# Patient Record
Sex: FEMALE | Race: WHITE | Employment: OTHER | URBAN - METROPOLITAN AREA
[De-identification: names, ages, dates, MRNs, and addresses within clinical notes are randomized per-mention and may not be internally consistent; named-entity substitution may affect disease eponyms.]

---

## 2020-11-16 ENCOUNTER — NEW PATIENT (OUTPATIENT)
Dept: URBAN - METROPOLITAN AREA CLINIC 26 | Facility: CLINIC | Age: 70
End: 2020-11-16

## 2020-11-16 VITALS
HEART RATE: 86 BPM | SYSTOLIC BLOOD PRESSURE: 119 MMHG | DIASTOLIC BLOOD PRESSURE: 82 MMHG | HEIGHT: 67 IN | WEIGHT: 165 LBS | BODY MASS INDEX: 25.9 KG/M2

## 2020-11-16 DIAGNOSIS — H04.123: ICD-10-CM

## 2020-11-16 DIAGNOSIS — H35.432: ICD-10-CM

## 2020-11-16 DIAGNOSIS — H35.412: ICD-10-CM

## 2020-11-16 DIAGNOSIS — D31.32: ICD-10-CM

## 2020-11-16 DIAGNOSIS — H02.831: ICD-10-CM

## 2020-11-16 DIAGNOSIS — Q14.1: ICD-10-CM

## 2020-11-16 DIAGNOSIS — H35.371: ICD-10-CM

## 2020-11-16 DIAGNOSIS — H35.341: ICD-10-CM

## 2020-11-16 DIAGNOSIS — H02.834: ICD-10-CM

## 2020-11-16 DIAGNOSIS — H43.392: ICD-10-CM

## 2020-11-16 PROCEDURE — 99204 OFFICE O/P NEW MOD 45 MIN: CPT

## 2020-11-16 PROCEDURE — 92250 FUNDUS PHOTOGRAPHY W/I&R: CPT

## 2020-11-16 ASSESSMENT — VISUAL ACUITY
OD_CC: 20/60-1
OS_PH: 20/100
OS_CC: 20/30-1
OD_SC: 20/400-1
OS_SC: 20/400

## 2020-11-16 ASSESSMENT — TONOMETRY
OD_IOP_MMHG: 18
OS_IOP_MMHG: 19

## 2020-12-02 ENCOUNTER — PRE-OP VISIT (OUTPATIENT)
Dept: URBAN - METROPOLITAN AREA CLINIC 26 | Facility: CLINIC | Age: 70
End: 2020-12-02

## 2020-12-02 VITALS — HEIGHT: 67 IN | BODY MASS INDEX: 25.9 KG/M2 | WEIGHT: 165 LBS

## 2020-12-02 DIAGNOSIS — H43.813: ICD-10-CM

## 2020-12-02 DIAGNOSIS — D31.32: ICD-10-CM

## 2020-12-02 DIAGNOSIS — H43.23: ICD-10-CM

## 2020-12-02 DIAGNOSIS — H35.341: ICD-10-CM

## 2020-12-02 DIAGNOSIS — H35.412: ICD-10-CM

## 2020-12-02 DIAGNOSIS — H35.371: ICD-10-CM

## 2020-12-02 DIAGNOSIS — H43.392: ICD-10-CM

## 2020-12-02 DIAGNOSIS — H35.432: ICD-10-CM

## 2020-12-02 DIAGNOSIS — H43.822: ICD-10-CM

## 2020-12-02 PROCEDURE — 92012 INTRM OPH EXAM EST PATIENT: CPT

## 2020-12-02 RX ORDER — PREDNISOLONE ACETATE 10 MG/ML: 1 SUSPENSION/ DROPS OPHTHALMIC

## 2020-12-02 RX ORDER — TOBRAMYCIN 3 MG/ML: 1 SOLUTION/ DROPS OPHTHALMIC EVERY 4 HOURS

## 2020-12-02 ASSESSMENT — VISUAL ACUITY
OS_CC: 20/30+2
OD_CC: 20/60-2

## 2020-12-02 ASSESSMENT — TONOMETRY
OS_IOP_MMHG: 17
OD_IOP_MMHG: 18

## 2020-12-31 ENCOUNTER — FOLLOW UP (OUTPATIENT)
Dept: URBAN - METROPOLITAN AREA CLINIC 26 | Facility: CLINIC | Age: 70
End: 2020-12-31

## 2020-12-31 DIAGNOSIS — Q14.1: ICD-10-CM

## 2020-12-31 DIAGNOSIS — H35.371: ICD-10-CM

## 2020-12-31 DIAGNOSIS — H35.432: ICD-10-CM

## 2020-12-31 DIAGNOSIS — H43.813: ICD-10-CM

## 2020-12-31 DIAGNOSIS — D31.32: ICD-10-CM

## 2020-12-31 DIAGNOSIS — H35.412: ICD-10-CM

## 2020-12-31 DIAGNOSIS — H35.341: ICD-10-CM

## 2020-12-31 DIAGNOSIS — H43.822: ICD-10-CM

## 2020-12-31 DIAGNOSIS — H43.23: ICD-10-CM

## 2020-12-31 DIAGNOSIS — H43.392: ICD-10-CM

## 2020-12-31 PROCEDURE — 92012 INTRM OPH EXAM EST PATIENT: CPT

## 2020-12-31 ASSESSMENT — VISUAL ACUITY
OS_CC: 20/30-1
OD_CC: 20/60-2

## 2020-12-31 ASSESSMENT — TONOMETRY
OS_IOP_MMHG: 15
OD_IOP_MMHG: 17

## 2021-02-05 ENCOUNTER — PRE-OP VISIT (OUTPATIENT)
Dept: URBAN - METROPOLITAN AREA CLINIC 26 | Facility: CLINIC | Age: 71
End: 2021-02-05

## 2021-02-05 VITALS — BODY MASS INDEX: 25.9 KG/M2 | HEIGHT: 67 IN | WEIGHT: 165 LBS

## 2021-02-05 DIAGNOSIS — D31.32: ICD-10-CM

## 2021-02-05 DIAGNOSIS — Q14.1: ICD-10-CM

## 2021-02-05 DIAGNOSIS — H43.392: ICD-10-CM

## 2021-02-05 DIAGNOSIS — H35.412: ICD-10-CM

## 2021-02-05 DIAGNOSIS — H43.23: ICD-10-CM

## 2021-02-05 DIAGNOSIS — H35.432: ICD-10-CM

## 2021-02-05 DIAGNOSIS — H35.371: ICD-10-CM

## 2021-02-05 DIAGNOSIS — H35.341: ICD-10-CM

## 2021-02-05 DIAGNOSIS — H43.822: ICD-10-CM

## 2021-02-05 DIAGNOSIS — H43.813: ICD-10-CM

## 2021-02-05 PROCEDURE — 92012 INTRM OPH EXAM EST PATIENT: CPT

## 2021-02-05 PROCEDURE — 92250 FUNDUS PHOTOGRAPHY W/I&R: CPT

## 2021-02-05 ASSESSMENT — TONOMETRY
OS_IOP_MMHG: 16
OD_IOP_MMHG: 12

## 2021-02-05 ASSESSMENT — VISUAL ACUITY
OD_CC: 20/80+2
OS_CC: 20/30-2

## 2021-02-10 ENCOUNTER — SURGERY/PROCEDURE (OUTPATIENT)
Dept: URBAN - METROPOLITAN AREA SURGERY 10 | Facility: SURGERY | Age: 71
End: 2021-02-10

## 2021-02-10 DIAGNOSIS — H35.341: ICD-10-CM

## 2021-02-10 PROCEDURE — 67042 VIT FOR MACULAR HOLE: CPT

## 2021-02-11 ENCOUNTER — POST OP-DILATION (OUTPATIENT)
Dept: URBAN - METROPOLITAN AREA CLINIC 26 | Facility: CLINIC | Age: 71
End: 2021-02-11

## 2021-02-11 DIAGNOSIS — H35.341: ICD-10-CM

## 2021-02-11 DIAGNOSIS — Z98.890: ICD-10-CM

## 2021-02-11 DIAGNOSIS — H35.371: ICD-10-CM

## 2021-02-11 PROCEDURE — 99024 POSTOP FOLLOW-UP VISIT: CPT

## 2021-02-11 ASSESSMENT — TONOMETRY
OD_IOP_MMHG: 19
OS_IOP_MMHG: 15

## 2021-02-11 ASSESSMENT — VISUAL ACUITY: OS_SC: 20/400

## 2021-02-17 ENCOUNTER — POST OP-DILATION (OUTPATIENT)
Dept: URBAN - METROPOLITAN AREA CLINIC 26 | Facility: CLINIC | Age: 71
End: 2021-02-17

## 2021-02-17 DIAGNOSIS — Z98.890: ICD-10-CM

## 2021-02-17 DIAGNOSIS — H35.341: ICD-10-CM

## 2021-02-17 PROCEDURE — 99024 POSTOP FOLLOW-UP VISIT: CPT

## 2021-02-17 ASSESSMENT — VISUAL ACUITY
OS_SC: 20/400
OD_SC: CF 1FT

## 2021-02-17 ASSESSMENT — TONOMETRY
OD_IOP_MMHG: 18
OS_IOP_MMHG: 17

## 2021-03-10 ENCOUNTER — POST OP-DILATION (OUTPATIENT)
Dept: URBAN - METROPOLITAN AREA CLINIC 26 | Facility: CLINIC | Age: 71
End: 2021-03-10

## 2021-03-10 DIAGNOSIS — Q14.1: ICD-10-CM

## 2021-03-10 DIAGNOSIS — H43.822: ICD-10-CM

## 2021-03-10 DIAGNOSIS — D31.32: ICD-10-CM

## 2021-03-10 DIAGNOSIS — H43.392: ICD-10-CM

## 2021-03-10 DIAGNOSIS — H35.432: ICD-10-CM

## 2021-03-10 DIAGNOSIS — H35.412: ICD-10-CM

## 2021-03-10 PROCEDURE — 92250 FUNDUS PHOTOGRAPHY W/I&R: CPT

## 2021-03-10 PROCEDURE — 99024 POSTOP FOLLOW-UP VISIT: CPT

## 2021-03-10 PROCEDURE — 92134 CPTRZ OPH DX IMG PST SGM RTA: CPT

## 2021-03-10 ASSESSMENT — TONOMETRY
OS_IOP_MMHG: 15
OD_IOP_MMHG: 16

## 2021-03-10 ASSESSMENT — VISUAL ACUITY
OS_CC: 20/30
OD_CC: 20/80-1

## 2021-04-21 ENCOUNTER — FOLLOW UP (OUTPATIENT)
Dept: URBAN - METROPOLITAN AREA CLINIC 26 | Facility: CLINIC | Age: 71
End: 2021-04-21

## 2021-04-21 DIAGNOSIS — H43.822: ICD-10-CM

## 2021-04-21 DIAGNOSIS — Q14.1: ICD-10-CM

## 2021-04-21 DIAGNOSIS — H35.432: ICD-10-CM

## 2021-04-21 DIAGNOSIS — Z98.890: ICD-10-CM

## 2021-04-21 PROCEDURE — 92250 FUNDUS PHOTOGRAPHY W/I&R: CPT

## 2021-04-21 PROCEDURE — 99024 POSTOP FOLLOW-UP VISIT: CPT

## 2021-04-21 PROCEDURE — 92134 CPTRZ OPH DX IMG PST SGM RTA: CPT

## 2021-04-21 ASSESSMENT — TONOMETRY
OD_IOP_MMHG: 17
OS_IOP_MMHG: 18

## 2021-04-21 ASSESSMENT — VISUAL ACUITY
OS_CC: 20/30-2
OD_CC: 20/60

## 2023-01-23 ENCOUNTER — FOLLOW UP (OUTPATIENT)
Dept: URBAN - METROPOLITAN AREA CLINIC 26 | Facility: CLINIC | Age: 73
End: 2023-01-23

## 2023-01-23 DIAGNOSIS — H43.822: ICD-10-CM

## 2023-01-23 DIAGNOSIS — H43.392: ICD-10-CM

## 2023-01-23 DIAGNOSIS — D31.32: ICD-10-CM

## 2023-01-23 DIAGNOSIS — H40.051: ICD-10-CM

## 2023-01-23 DIAGNOSIS — H04.123: ICD-10-CM

## 2023-01-23 DIAGNOSIS — Q14.1: ICD-10-CM

## 2023-01-23 DIAGNOSIS — H35.412: ICD-10-CM

## 2023-01-23 DIAGNOSIS — H43.22: ICD-10-CM

## 2023-01-23 DIAGNOSIS — H35.432: ICD-10-CM

## 2023-01-23 PROCEDURE — 92014 COMPRE OPH EXAM EST PT 1/>: CPT

## 2023-01-23 PROCEDURE — 92250 FUNDUS PHOTOGRAPHY W/I&R: CPT

## 2023-01-23 PROCEDURE — 92134 CPTRZ OPH DX IMG PST SGM RTA: CPT

## 2023-01-23 ASSESSMENT — TONOMETRY
OS_IOP_MMHG: 19
OD_IOP_MMHG: 22
OD_IOP_MMHG: 24

## 2023-01-23 ASSESSMENT — VISUAL ACUITY
OD_PH: 20/50+1
OD_CC: 20/70-2
OS_CC: 20/40
OS_PH: 20/30-1

## 2023-10-25 ENCOUNTER — COMPREHENSIVE EXAM (OUTPATIENT)
Dept: URBAN - METROPOLITAN AREA CLINIC 26 | Facility: CLINIC | Age: 73
End: 2023-10-25

## 2023-10-25 DIAGNOSIS — H43.822: ICD-10-CM

## 2023-10-25 DIAGNOSIS — H26.493: ICD-10-CM

## 2023-10-25 DIAGNOSIS — H02.831: ICD-10-CM

## 2023-10-25 DIAGNOSIS — Q14.1: ICD-10-CM

## 2023-10-25 DIAGNOSIS — Z98.890: ICD-10-CM

## 2023-10-25 DIAGNOSIS — H43.392: ICD-10-CM

## 2023-10-25 DIAGNOSIS — D31.32: ICD-10-CM

## 2023-10-25 DIAGNOSIS — Z96.1: ICD-10-CM

## 2023-10-25 DIAGNOSIS — H02.834: ICD-10-CM

## 2023-10-25 DIAGNOSIS — H40.051: ICD-10-CM

## 2023-10-25 DIAGNOSIS — H43.22: ICD-10-CM

## 2023-10-25 DIAGNOSIS — H04.123: ICD-10-CM

## 2023-10-25 DIAGNOSIS — H35.412: ICD-10-CM

## 2023-10-25 DIAGNOSIS — H35.432: ICD-10-CM

## 2023-10-25 PROCEDURE — 92014 COMPRE OPH EXAM EST PT 1/>: CPT

## 2023-10-25 PROCEDURE — 92250 FUNDUS PHOTOGRAPHY W/I&R: CPT | Mod: 59

## 2023-10-25 PROCEDURE — 92134 CPTRZ OPH DX IMG PST SGM RTA: CPT

## 2023-10-25 ASSESSMENT — VISUAL ACUITY
OD_PH: 20/30-2
OS_SC: 20/20-2
OD_SC: 20/100

## 2023-10-25 ASSESSMENT — TONOMETRY
OS_IOP_MMHG: 14
OD_IOP_MMHG: 19, 20
OD_IOP_MMHG: 20

## 2024-01-11 ENCOUNTER — COMPREHENSIVE EXAM (OUTPATIENT)
Dept: URBAN - METROPOLITAN AREA CLINIC 26 | Facility: CLINIC | Age: 74
End: 2024-01-11

## 2024-01-11 DIAGNOSIS — H43.22: ICD-10-CM

## 2024-01-11 DIAGNOSIS — H35.351: ICD-10-CM

## 2024-01-11 DIAGNOSIS — H26.493: ICD-10-CM

## 2024-01-11 DIAGNOSIS — H35.371: ICD-10-CM

## 2024-01-11 DIAGNOSIS — H04.123: ICD-10-CM

## 2024-01-11 DIAGNOSIS — H02.834: ICD-10-CM

## 2024-01-11 DIAGNOSIS — H40.1131: ICD-10-CM

## 2024-01-11 DIAGNOSIS — H35.372: ICD-10-CM

## 2024-01-11 DIAGNOSIS — H43.392: ICD-10-CM

## 2024-01-11 DIAGNOSIS — H35.432: ICD-10-CM

## 2024-01-11 DIAGNOSIS — Q14.1: ICD-10-CM

## 2024-01-11 DIAGNOSIS — H02.831: ICD-10-CM

## 2024-01-11 DIAGNOSIS — Z98.890: ICD-10-CM

## 2024-01-11 DIAGNOSIS — Z96.1: ICD-10-CM

## 2024-01-11 DIAGNOSIS — H35.412: ICD-10-CM

## 2024-01-11 DIAGNOSIS — H40.051: ICD-10-CM

## 2024-01-11 DIAGNOSIS — D31.32: ICD-10-CM

## 2024-01-11 DIAGNOSIS — H43.822: ICD-10-CM

## 2024-01-11 PROCEDURE — 92134 CPTRZ OPH DX IMG PST SGM RTA: CPT

## 2024-01-11 PROCEDURE — 92014 COMPRE OPH EXAM EST PT 1/>: CPT

## 2024-01-11 PROCEDURE — 92250 FUNDUS PHOTOGRAPHY W/I&R: CPT

## 2024-01-11 RX ORDER — PREDNISOLONE ACETATE 10 MG/ML: 1 SUSPENSION/ DROPS OPHTHALMIC

## 2024-01-11 RX ORDER — LATANOPROST 50 UG/ML: 1 SOLUTION/ DROPS OPHTHALMIC EVERY EVENING

## 2024-01-11 RX ORDER — KETOROLAC TROMETHAMINE 5 MG/ML: 1 SOLUTION OPHTHALMIC

## 2024-01-11 ASSESSMENT — TONOMETRY
OD_IOP_MMHG: 13
OS_IOP_MMHG: 13

## 2024-01-11 ASSESSMENT — VISUAL ACUITY
OD_SC: 20/40-1
OS_SC: 20/25-2

## 2024-02-14 ENCOUNTER — COMPREHENSIVE EXAM (OUTPATIENT)
Dept: URBAN - METROPOLITAN AREA CLINIC 26 | Facility: CLINIC | Age: 74
End: 2024-02-14

## 2024-02-14 VITALS — BODY MASS INDEX: 25.9 KG/M2 | WEIGHT: 165 LBS | HEIGHT: 67 IN

## 2024-02-14 DIAGNOSIS — H43.822: ICD-10-CM

## 2024-02-14 DIAGNOSIS — H35.432: ICD-10-CM

## 2024-02-14 DIAGNOSIS — Z96.1: ICD-10-CM

## 2024-02-14 DIAGNOSIS — H35.372: ICD-10-CM

## 2024-02-14 DIAGNOSIS — H43.392: ICD-10-CM

## 2024-02-14 DIAGNOSIS — Q14.1: ICD-10-CM

## 2024-02-14 DIAGNOSIS — H35.371: ICD-10-CM

## 2024-02-14 DIAGNOSIS — H26.493: ICD-10-CM

## 2024-02-14 DIAGNOSIS — H02.831: ICD-10-CM

## 2024-02-14 DIAGNOSIS — H40.051: ICD-10-CM

## 2024-02-14 DIAGNOSIS — H02.834: ICD-10-CM

## 2024-02-14 DIAGNOSIS — D31.32: ICD-10-CM

## 2024-02-14 DIAGNOSIS — H43.22: ICD-10-CM

## 2024-02-14 DIAGNOSIS — Z98.890: ICD-10-CM

## 2024-02-14 DIAGNOSIS — H04.123: ICD-10-CM

## 2024-02-14 DIAGNOSIS — H40.1131: ICD-10-CM

## 2024-02-14 DIAGNOSIS — H35.412: ICD-10-CM

## 2024-02-14 DIAGNOSIS — H35.351: ICD-10-CM

## 2024-02-14 PROCEDURE — 92012 INTRM OPH EXAM EST PATIENT: CPT

## 2024-02-14 PROCEDURE — 92250 FUNDUS PHOTOGRAPHY W/I&R: CPT

## 2024-02-14 PROCEDURE — 92134 CPTRZ OPH DX IMG PST SGM RTA: CPT

## 2024-02-14 RX ORDER — TOBRAMYCIN 3 MG/ML: 1 SOLUTION/ DROPS OPHTHALMIC EVERY 4 HOURS

## 2024-02-14 ASSESSMENT — TONOMETRY
OD_IOP_MMHG: 16
OS_IOP_MMHG: 13

## 2024-02-14 ASSESSMENT — VISUAL ACUITY
OD_SC: 20/30-1
OS_SC: 20/25-2

## 2024-03-06 ENCOUNTER — SURGERY/PROCEDURE (OUTPATIENT)
Facility: LOCATION | Age: 74
End: 2024-03-06

## 2024-03-06 DIAGNOSIS — H35.372: ICD-10-CM

## 2024-03-06 PROCEDURE — 67042 VIT FOR MACULAR HOLE: CPT

## 2024-03-07 ENCOUNTER — POST-OP (OUTPATIENT)
Dept: URBAN - METROPOLITAN AREA CLINIC 26 | Facility: CLINIC | Age: 74
End: 2024-03-07

## 2024-03-07 DIAGNOSIS — H04.123: ICD-10-CM

## 2024-03-07 DIAGNOSIS — D31.32: ICD-10-CM

## 2024-03-07 DIAGNOSIS — H26.493: ICD-10-CM

## 2024-03-07 DIAGNOSIS — Z96.1: ICD-10-CM

## 2024-03-07 DIAGNOSIS — H35.372: ICD-10-CM

## 2024-03-07 DIAGNOSIS — H02.831: ICD-10-CM

## 2024-03-07 DIAGNOSIS — H43.392: ICD-10-CM

## 2024-03-07 DIAGNOSIS — H02.834: ICD-10-CM

## 2024-03-07 DIAGNOSIS — H43.822: ICD-10-CM

## 2024-03-07 DIAGNOSIS — H35.412: ICD-10-CM

## 2024-03-07 DIAGNOSIS — H35.432: ICD-10-CM

## 2024-03-07 DIAGNOSIS — H40.051: ICD-10-CM

## 2024-03-07 DIAGNOSIS — H40.1131: ICD-10-CM

## 2024-03-07 DIAGNOSIS — H35.371: ICD-10-CM

## 2024-03-07 DIAGNOSIS — Z98.890: ICD-10-CM

## 2024-03-07 DIAGNOSIS — Q14.1: ICD-10-CM

## 2024-03-07 DIAGNOSIS — H43.22: ICD-10-CM

## 2024-03-07 DIAGNOSIS — H35.351: ICD-10-CM

## 2024-03-07 PROCEDURE — 99024 POSTOP FOLLOW-UP VISIT: CPT

## 2024-03-07 ASSESSMENT — TONOMETRY: OS_IOP_MMHG: 13

## 2024-03-07 ASSESSMENT — VISUAL ACUITY: OS_SC: CF 2FT

## 2024-03-14 ENCOUNTER — POST-OP (OUTPATIENT)
Dept: URBAN - METROPOLITAN AREA CLINIC 26 | Facility: CLINIC | Age: 74
End: 2024-03-14

## 2024-03-14 DIAGNOSIS — H35.371: ICD-10-CM

## 2024-03-14 DIAGNOSIS — H35.412: ICD-10-CM

## 2024-03-14 DIAGNOSIS — H40.1131: ICD-10-CM

## 2024-03-14 DIAGNOSIS — H35.432: ICD-10-CM

## 2024-03-14 DIAGNOSIS — H04.123: ICD-10-CM

## 2024-03-14 DIAGNOSIS — Z98.890: ICD-10-CM

## 2024-03-14 DIAGNOSIS — H43.822: ICD-10-CM

## 2024-03-14 DIAGNOSIS — H02.831: ICD-10-CM

## 2024-03-14 DIAGNOSIS — H43.392: ICD-10-CM

## 2024-03-14 DIAGNOSIS — H43.22: ICD-10-CM

## 2024-03-14 DIAGNOSIS — H40.051: ICD-10-CM

## 2024-03-14 DIAGNOSIS — Z96.1: ICD-10-CM

## 2024-03-14 DIAGNOSIS — D31.32: ICD-10-CM

## 2024-03-14 DIAGNOSIS — H35.372: ICD-10-CM

## 2024-03-14 DIAGNOSIS — Q14.1: ICD-10-CM

## 2024-03-14 DIAGNOSIS — H02.834: ICD-10-CM

## 2024-03-14 DIAGNOSIS — H35.351: ICD-10-CM

## 2024-03-14 DIAGNOSIS — H26.493: ICD-10-CM

## 2024-03-14 PROCEDURE — 99024 POSTOP FOLLOW-UP VISIT: CPT

## 2024-03-14 ASSESSMENT — TONOMETRY: OS_IOP_MMHG: 16

## 2024-03-14 ASSESSMENT — VISUAL ACUITY: OS_SC: 20/40-1

## 2024-04-10 ENCOUNTER — POST-OP (OUTPATIENT)
Dept: URBAN - METROPOLITAN AREA CLINIC 26 | Facility: CLINIC | Age: 74
End: 2024-04-10

## 2024-04-10 DIAGNOSIS — H35.371: ICD-10-CM

## 2024-04-10 DIAGNOSIS — H43.392: ICD-10-CM

## 2024-04-10 DIAGNOSIS — H40.1131: ICD-10-CM

## 2024-04-10 DIAGNOSIS — Q14.1: ICD-10-CM

## 2024-04-10 DIAGNOSIS — H02.831: ICD-10-CM

## 2024-04-10 DIAGNOSIS — H40.051: ICD-10-CM

## 2024-04-10 DIAGNOSIS — H35.353: ICD-10-CM

## 2024-04-10 DIAGNOSIS — H26.493: ICD-10-CM

## 2024-04-10 DIAGNOSIS — Z98.890: ICD-10-CM

## 2024-04-10 DIAGNOSIS — H02.834: ICD-10-CM

## 2024-04-10 DIAGNOSIS — H35.432: ICD-10-CM

## 2024-04-10 DIAGNOSIS — H04.123: ICD-10-CM

## 2024-04-10 DIAGNOSIS — D31.32: ICD-10-CM

## 2024-04-10 DIAGNOSIS — H43.822: ICD-10-CM

## 2024-04-10 DIAGNOSIS — H35.412: ICD-10-CM

## 2024-04-10 DIAGNOSIS — H43.22: ICD-10-CM

## 2024-04-10 DIAGNOSIS — H35.372: ICD-10-CM

## 2024-04-10 DIAGNOSIS — Z96.1: ICD-10-CM

## 2024-04-10 PROCEDURE — 92134 CPTRZ OPH DX IMG PST SGM RTA: CPT

## 2024-04-10 PROCEDURE — 99024 POSTOP FOLLOW-UP VISIT: CPT

## 2024-04-10 RX ORDER — KETOROLAC TROMETHAMINE 5 MG/ML: 1 SOLUTION OPHTHALMIC

## 2024-04-10 RX ORDER — PREDNISOLONE ACETATE 10 MG/ML: 1 SUSPENSION/ DROPS OPHTHALMIC

## 2024-04-10 ASSESSMENT — TONOMETRY
OD_IOP_MMHG: 17
OS_IOP_MMHG: 18

## 2024-04-10 ASSESSMENT — VISUAL ACUITY
OD_CC: 20/30-1
OS_CC: 20/60-1